# Patient Record
Sex: FEMALE
[De-identification: names, ages, dates, MRNs, and addresses within clinical notes are randomized per-mention and may not be internally consistent; named-entity substitution may affect disease eponyms.]

---

## 2017-07-07 ENCOUNTER — HOSPITAL ENCOUNTER (EMERGENCY)
Dept: HOSPITAL 14 - H.ER | Age: 27
Discharge: HOME | End: 2017-07-07
Payer: COMMERCIAL

## 2017-07-07 VITALS — BODY MASS INDEX: 31.4 KG/M2

## 2017-07-07 VITALS
OXYGEN SATURATION: 97 % | TEMPERATURE: 97 F | DIASTOLIC BLOOD PRESSURE: 65 MMHG | HEART RATE: 82 BPM | SYSTOLIC BLOOD PRESSURE: 121 MMHG

## 2017-07-07 VITALS — RESPIRATION RATE: 20 BRPM
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
## 2017-07-07 DIAGNOSIS — M25.532: Primary | ICD-10-CM

## 2017-07-07 NOTE — RAD
PROCEDURE:  Left Wrist Radiographs.







HISTORY:

INJURY 2 WEEKS AGO; PAIN BY BASE OF THUMB



COMPARISON:

None.



FINDINGS:



BONES:

Bone alignment and mineralization are normal.  There is no acute 

fracture or bone destruction. 



JOINTS:

The proximal and distal carpal rows are maintained. No dislocation. 



SOFT TISSUES:

Normal. 



OTHER FINDINGS:

None.



IMPRESSION:

No acute fracture or dislocation.

## 2017-07-07 NOTE — ED PDOC
Upper Extremity Pain/Injury


Time Seen by Provider: 07/07/17 10:55


Chief Complaint (Nursing): Upper Extremity Problem/Injury


Chief Complaint (Provider): LEFT WRIST PAIN


History Per: Patient (28 Y/O FEMALE HERE WITH LEFT WRIST PAIN  ON AND OFF X 2 

WEEKS AFTER FALL AT WORK.  STATES SHE HAD IMPROVEMENT UNTIL FLIPPING FOOD ON 

MONTALVO YESTERDAY AT WORK. STATES  SHE FELT A POP AND AS IF WRIST COULD NOT BE 

MOVED IN ANY DIRECTION.  ICED WRIST AT THAT TIME. IS RIGHT HAND DOMINANT. TOOK 

IBUPROFEN YESTERDAY.)





Past Medical History


Reviewed: Historical Data, Nursing Documentation, Vital Signs


Vital Signs: 


 Last Vital Signs











Temp  97 F L  07/07/17 10:48


 


Pulse  82   07/07/17 10:48


 


Resp      


 


BP  121/65   07/07/17 10:48


 


Pulse Ox  97   07/07/17 10:48














- Medical History


PMH: Migraine





- Family History


Family History: States: Unknown Family Hx





- Home Medications


Home Medications: 


 Ambulatory Orders











 Medication  Instructions  Recorded


 


Ciprofloxacin HCl [Cipro] 500 mg PO BID #20 tab 01/13/17


 


Dicyclomine [Bentyl] 20 mg PO BID PRN #30 tab 01/13/17


 


Ondansetron [Zofran] 4 mg PO Q8H PRN #30 tab 01/13/17


 


metroNIDAZOLE [Flagyl] 500 mg PO TID #30 tab 01/13/17


 


Ibuprofen [Motrin] 600 mg PO Q8 PRN #21 tab 07/07/17














- Allergies


Allergies/Adverse Reactions: 


 Allergies











Allergy/AdvReac Type Severity Reaction Status Date / Time


 


shellfish derived Allergy  SWELLING Verified 07/07/17 10:52














Review of Systems


ROS Statement: Except As Marked, All Systems Reviewed And Found Negative





Physical Exam





- Reviewed


Nursing Documentation Reviewed: Yes


Vital Signs Reviewed: Yes





- Physical Exam


Appears: Positive for: Well, Non-toxic, No Acute Distress


Head Exam: Positive for: ATRAUMATIC, NORMAL INSPECTION, NORMOCEPHALIC


Skin: Positive for: Normal Color, Warm, DRY


Eye Exam: Positive for: EOMI, Normal appearance, PERRL


ENT: Positive for: Normal ENT Inspection


Neck: Positive for: Normal, Painless ROM


Cardiovascular/Chest: Positive for: Regular Rate, Rhythm


Respiratory: Positive for: CNT, Normal Breath Sounds


Gastrointestinal/Abdominal: Positive for: Normal Exam, Bowel Sounds, Soft


Back: Positive for: Normal Inspection


Extremity: Positive for: Normal ROM, Tenderness ((+) SNUFFBOX TENDERNESS. NO 

OBVIOUS SWELLING/ERYTHEMA NOTED. GOOD RADIAL PULSE)


Neurologic/Psych: Positive for: Alert, Oriented





- ECG


O2 Sat by Pulse Oximetry: 97





- Progress


ED Course And Treament: 





XRY OF WRIST: NO ACUTE FX





PLACED IN THUMB SPICA SPLINT IN ED.





Disposition





- Clinical Impression


Clinical Impression: 


 Wrist injury








- Patient ED Disposition


Is Patient to be Admitted: No





- Disposition


Referrals: 


Nereida Acevedo MD [Staff Provider] - 


Disposition: Routine/Home


Disposition Time: 11:16


Condition: FAIR


Prescriptions: 


Ibuprofen [Motrin] 600 mg PO Q8 PRN #21 tab


 PRN Reason: Pain, Moderate (4-7)


Instructions:  Wrist Injury (ED)


Forms:  [facility] ED School/Work Excuse

## 2018-01-23 ENCOUNTER — HOSPITAL ENCOUNTER (EMERGENCY)
Dept: HOSPITAL 14 - H.ER | Age: 28
Discharge: HOME | End: 2018-01-23
Payer: COMMERCIAL

## 2018-01-23 VITALS
TEMPERATURE: 98.1 F | SYSTOLIC BLOOD PRESSURE: 132 MMHG | HEART RATE: 78 BPM | DIASTOLIC BLOOD PRESSURE: 79 MMHG | RESPIRATION RATE: 15 BRPM

## 2018-01-23 VITALS — OXYGEN SATURATION: 98 %

## 2018-01-23 VITALS — BODY MASS INDEX: 31.4 KG/M2

## 2018-01-23 DIAGNOSIS — L03.116: Primary | ICD-10-CM

## 2018-01-23 LAB
ALBUMIN SERPL-MCNC: 4.6 G/DL (ref 3.5–5)
ALBUMIN/GLOB SERPL: 1.4 {RATIO} (ref 1–2.1)
ALT SERPL-CCNC: 29 U/L (ref 9–52)
AST SERPL-CCNC: 34 U/L (ref 14–36)
BASOPHILS # BLD AUTO: 0.1 K/UL (ref 0–0.2)
BASOPHILS NFR BLD: 0.7 % (ref 0–2)
BUN SERPL-MCNC: 13 MG/DL (ref 7–17)
CALCIUM SERPL-MCNC: 9.6 MG/DL (ref 8.4–10.2)
EOSINOPHIL # BLD AUTO: 0.1 K/UL (ref 0–0.7)
EOSINOPHIL NFR BLD: 1.2 % (ref 0–4)
ERYTHROCYTE [DISTWIDTH] IN BLOOD BY AUTOMATED COUNT: 13.7 % (ref 11.5–14.5)
GFR NON-AFRICAN AMERICAN: > 60
HGB BLD-MCNC: 12.5 G/DL (ref 12–16)
LYMPHOCYTES # BLD AUTO: 3.3 K/UL (ref 1–4.3)
LYMPHOCYTES NFR BLD AUTO: 30.8 % (ref 20–40)
MCH RBC QN AUTO: 29 PG (ref 27–31)
MCHC RBC AUTO-ENTMCNC: 32.7 G/DL (ref 33–37)
MCV RBC AUTO: 88.5 FL (ref 81–99)
MONOCYTES # BLD: 1 K/UL (ref 0–0.8)
MONOCYTES NFR BLD: 9.2 % (ref 0–10)
NEUTROPHILS # BLD: 6.2 K/UL (ref 1.8–7)
NEUTROPHILS NFR BLD AUTO: 58.1 % (ref 50–75)
NRBC BLD AUTO-RTO: 0 % (ref 0–0)
PLATELET # BLD: 234 K/UL (ref 130–400)
PMV BLD AUTO: 8.6 FL (ref 7.2–11.7)
RBC # BLD AUTO: 4.31 MIL/UL (ref 3.8–5.2)
WBC # BLD AUTO: 10.7 K/UL (ref 4.8–10.8)

## 2018-01-23 PROCEDURE — 87040 BLOOD CULTURE FOR BACTERIA: CPT

## 2018-01-23 PROCEDURE — 85025 COMPLETE CBC W/AUTO DIFF WBC: CPT

## 2018-01-23 PROCEDURE — 81025 URINE PREGNANCY TEST: CPT

## 2018-01-23 PROCEDURE — 96365 THER/PROPH/DIAG IV INF INIT: CPT

## 2018-01-23 PROCEDURE — 96375 TX/PRO/DX INJ NEW DRUG ADDON: CPT

## 2018-01-23 PROCEDURE — 99282 EMERGENCY DEPT VISIT SF MDM: CPT

## 2018-01-23 PROCEDURE — 80053 COMPREHEN METABOLIC PANEL: CPT

## 2018-01-23 PROCEDURE — 96361 HYDRATE IV INFUSION ADD-ON: CPT

## 2018-07-24 NOTE — ED PDOC
- Laboratory Results


Result Diagrams: 


 01/23/18 19:15





 01/23/18 19:15





- ECG


O2 Sat by Pulse Oximetry: 98 (RA)


Pulse Ox Interpretation: Normal





Medical Decision Making


Medical Decision Making: 


Time: 19:00 


--transfer of care endorsed to me pending labs. 





Patient requires no further treatment in the ED at this time. Will be 

discharged home. Follow instructions as given. Return if symptoms persist or 

worsen.  








--------------------------------------------------------------------------------

-----------------





Scribe Attestation: 


Documented by Leti Lubin, acting as a scribe for Roxi Dumont MD 





Provider Scribe Attestation:


All medical record entries made by the Scribe were at my direction and 

personally dictated by me. I have reviewed the chart and agree that the record 

accurately reflects my personal performance of the history, physical exam, 

medical decision making, and the department course for this patient. I have 

also personally directed, reviewed, and agree with the discharge instructions 

and disposition








Disposition


Doctor Will See Patient In The: Office


Counseled Patient/Family Regarding: Studies Performed, Diagnosis, Need For 

Followup





- Clinical Impression


Clinical Impression: 


 Cellulitis








- POA


Present On Arrival: None





- Disposition


Referrals: 


[facility] [Outside]


Disposition: Routine/Home


Disposition Time: 21:50


Condition: GOOD


Additional Instructions: 


Take your medications as instructed. Return in 2-3 days if not improved and 

earlier for wound recheck if worsened. Follow up with your PCP in 2-3 days. 


Prescriptions: 


Cephalexin [Keflex] 500 mg PO BID #20 capsule


Sulfamethoxazole/Trimethoprim [Bactrim  mg-160 mg] 1 tab PO BID #20 tab


traMADol [Ultram] 50 mg PO TID PRN #9 tab


 PRN Reason: Pain, Severe (8-10)


Instructions:  Cellulitis (ED)
Lower Extremity Pain/Injury


Time Seen by Provider: 01/23/18 16:49


Chief Complaint (Nursing): Abnormal Skin Integrity


Chief Complaint (Provider): Abnormal Skin Integrity


History Per: Patient


History/Exam Limitations: no limitations


Onset/Duration Of Symptoms: Days (x 3)


Current Symptoms Are (Timing): Still Present


Additional Complaint(s): 


27 year old female presents to the ED complaining of left posterior thigh pain, 

onset 3 days ago. Patient believes she was bitten by something. Now the areas 

are tender and swollen. Notes mild nausea, headache, malaise. Denies fever or 

prior skin infections. 





PMD: none provided








Past Medical History


Reviewed: Historical Data, Nursing Documentation, Vital Signs


Vital Signs: 





 Last Vital Signs











Temp  98.9 F   01/23/18 16:31


 


Pulse  94 H  01/23/18 16:31


 


Resp  16   01/23/18 16:31


 


BP  146/75   01/23/18 16:31


 


Pulse Ox  98   01/23/18 16:31














- Medical History


PMH: Migraine


Other PMH: Pilonidal cyst





- Surgical History


Other surgeries: Perineal cyst





- Family History


Family History: States: Unknown Family Hx





- Social History


Current smoker - smoking cessation education provided: No


Ex-Smoker (has not smoked in the last 12 months): Yes


Alcohol: None


Drugs: Denies





- Home Medications


Home Medications: 


 Ambulatory Orders











 Medication  Instructions  Recorded


 


Ciprofloxacin HCl [Cipro] 500 mg PO BID #20 tab 01/13/17


 


Dicyclomine [Bentyl] 20 mg PO BID PRN #30 tab 01/13/17


 


Ondansetron [Zofran] 4 mg PO Q8H PRN #30 tab 01/13/17


 


metroNIDAZOLE [Flagyl] 500 mg PO TID #30 tab 01/13/17


 


Ibuprofen [Motrin] 600 mg PO Q8 PRN #21 tab 07/07/17














- Allergies


Allergies/Adverse Reactions: 


 Allergies











Allergy/AdvReac Type Severity Reaction Status Date / Time


 


shellfish derived Allergy  SWELLING Verified 01/23/18 16:31














Review of Systems


ROS Statement: Except As Marked, All Systems Reviewed And Found Negative


Constitutional: Positive for: Malaise


Gastrointestinal: Positive for: Nausea


Musculoskeletal: Positive for: Leg Pain (left posterior thigh)


Neurological: Positive for: Headache





Physical Exam





- Reviewed


Nursing Documentation Reviewed: Yes


Vital Signs Reviewed: Yes





- Physical Exam


Appears: Positive for: Non-toxic, No Acute Distress


Head Exam: Positive for: ATRAUMATIC, NORMOCEPHALIC


Skin: Positive for: Normal Color, Warm, Dry


Eye Exam: Positive for: Normal appearance, EOMI, PERRL


Neck: Positive for: Normal, Painless ROM, Supple


Cardiovascular/Chest: Positive for: Regular Rate, Rhythm.  Negative for: Murmur


Respiratory: Positive for: Normal Breath Sounds.  Negative for: Respiratory 

Distress


Gastrointestinal/Abdominal: Positive for: Normal Exam, Soft


Back: Positive for: Normal Inspection.  Negative for: L CVA Tenderness, R CVA 

Tenderness, Vertebral Tenderness


Extremity: Positive for: Normal ROM (full at hip and knee), Tenderness (three 

tender furuncles with mild surrounding erythema), Other (warmth)


Neurologic/Psych: Positive for: Alert, Oriented.  Negative for: Motor/Sensory 

Deficits





- ECG


O2 Sat by Pulse Oximetry: 98 (RA)


Pulse Ox Interpretation: Normal





Medical Decision Making


Medical Decision Making: 


Time: 18:31


Initial Plan: 


--CMP 


--CBC 


--Normal saline IV 1,000 mls/hr


--Toradol 30 mg IV 


--Zosyn 4.5 gm IVPB 


--Zofran Inj 4 mg IV 


--Blood culture





Patient will be worked up for cellulitis, lesions do not appear drainable at 

the time and there is no fluctuances. Will be given dose of Zosyn and 

transferred to the care of Dr. Julia Javed Attestation: 


Documented by Leti Lubin, acting as a scribe for Kevin Crespo III, DO 





Provider Scribe Attestation:


All medical record entries made by the Scribe were at my direction and 

personally dictated by me. I have reviewed the chart and agree that the record 

accurately reflects my personal performance of the history, physical exam, 

medical decision making, and the department course for this patient. I have 

also personally directed, reviewed, and agree with the discharge instructions 

and disposition





Disposition





- Clinical Impression


Clinical Impression: 


 Cellulitis








- Patient ED Disposition


Is Patient to be Admitted: Transfer of Care





- Disposition


Disposition: Transfer of Care


Disposition Time: 19:00


Condition: STABLE


Forms:  [email or web address] (English)


Patient Signed Over To: Roxi Dumont
30